# Patient Record
Sex: FEMALE
[De-identification: names, ages, dates, MRNs, and addresses within clinical notes are randomized per-mention and may not be internally consistent; named-entity substitution may affect disease eponyms.]

---

## 2019-06-27 ENCOUNTER — HOSPITAL ENCOUNTER (EMERGENCY)
Dept: HOSPITAL 46 - ED | Age: 39
LOS: 1 days | Discharge: HOME | End: 2019-06-28
Payer: SELF-PAY

## 2019-06-27 VITALS — HEIGHT: 59 IN | WEIGHT: 142 LBS | BODY MASS INDEX: 28.63 KG/M2

## 2019-06-27 DIAGNOSIS — K29.00: Primary | ICD-10-CM

## 2020-12-23 NOTE — XMS
PreManage Notification: BELINDA SOTO MRN:[id number]
 
Security Information
 
Security Events
No recent Security Events currently on file
 
 
 
CRITERIA MET
------------
- [facility] - 2 Visits in 30 Days
 
 
CARE PROVIDERS
-------------------------------------------------------------------------------------
TIAN VALLADARES      Physician Assistant     Current
 
PHONE: [phone]
-------------------------------------------------------------------------------------
 
[facility] has no Care Guidelines for this patient.
 
E.NADIA VISIT COUNT (12 MO.)
-------------------------------------------------------------------------------------
[address]
-------------------------------------------------------------------------------------
TOTAL 2
-------------------------------------------------------------------------------------
NOTE: Visits indicate total known visits.
 
ED/UCC VISIT TRACKING (12 MO.)
-------------------------------------------------------------------------------------
12/23/2020 22:47
[facility] [place]
 
TYPE: Emergency
 
COMPLAINT:
- ABDOMINAL PAIN
-------------------------------------------------------------------------------------
11/27/2020 16:09
[facility] [place]
 
TYPE: Emergency
 
DIAGNOSES:
- COVID-19
- +COVID; ABD PAIN
- Unspecified abdominal pain
-------------------------------------------------------------------------------------
 
 
INPATIENT VISIT TRACKING (12 MO.)
No inpatient visits to display in this time frame
 
[email or web address]